# Patient Record
Sex: FEMALE | Race: WHITE | Employment: FULL TIME | URBAN - NONMETROPOLITAN AREA
[De-identification: names, ages, dates, MRNs, and addresses within clinical notes are randomized per-mention and may not be internally consistent; named-entity substitution may affect disease eponyms.]

---

## 2019-05-09 ENCOUNTER — OFFICE VISIT (OUTPATIENT)
Dept: FAMILY MEDICINE CLINIC | Age: 39
End: 2019-05-09
Payer: COMMERCIAL

## 2019-05-09 VITALS
WEIGHT: 147.2 LBS | SYSTOLIC BLOOD PRESSURE: 110 MMHG | RESPIRATION RATE: 16 BRPM | TEMPERATURE: 98.4 F | HEART RATE: 74 BPM | BODY MASS INDEX: 24.53 KG/M2 | HEIGHT: 65 IN | OXYGEN SATURATION: 99 % | DIASTOLIC BLOOD PRESSURE: 70 MMHG

## 2019-05-09 DIAGNOSIS — M54.50 CHRONIC BILATERAL LOW BACK PAIN WITHOUT SCIATICA: Primary | ICD-10-CM

## 2019-05-09 DIAGNOSIS — G89.29 CHRONIC BILATERAL LOW BACK PAIN WITHOUT SCIATICA: Primary | ICD-10-CM

## 2019-05-09 PROCEDURE — G8427 DOCREV CUR MEDS BY ELIG CLIN: HCPCS | Performed by: FAMILY MEDICINE

## 2019-05-09 PROCEDURE — 99203 OFFICE O/P NEW LOW 30 MIN: CPT | Performed by: FAMILY MEDICINE

## 2019-05-09 PROCEDURE — G8420 CALC BMI NORM PARAMETERS: HCPCS | Performed by: FAMILY MEDICINE

## 2019-05-09 PROCEDURE — 1036F TOBACCO NON-USER: CPT | Performed by: FAMILY MEDICINE

## 2019-05-09 RX ORDER — PREDNISONE 10 MG/1
TABLET ORAL
Qty: 20 TABLET | Refills: 0 | Status: SHIPPED | OUTPATIENT
Start: 2019-05-09

## 2019-05-09 RX ORDER — IBUPROFEN 200 MG
200 TABLET ORAL EVERY 6 HOURS PRN
COMMUNITY

## 2019-05-09 RX ORDER — LANOLIN ALCOHOL/MO/W.PET/CERES
3 CREAM (GRAM) TOPICAL DAILY
COMMUNITY

## 2019-05-09 SDOH — HEALTH STABILITY: MENTAL HEALTH: HOW MANY STANDARD DRINKS CONTAINING ALCOHOL DO YOU HAVE ON A TYPICAL DAY?: 1 OR 2

## 2019-05-09 SDOH — HEALTH STABILITY: MENTAL HEALTH: HOW OFTEN DO YOU HAVE A DRINK CONTAINING ALCOHOL?: 2-3 TIMES A WEEK

## 2019-05-09 ASSESSMENT — ENCOUNTER SYMPTOMS
RESPIRATORY NEGATIVE: 1
BACK PAIN: 1
GASTROINTESTINAL NEGATIVE: 1

## 2019-05-09 ASSESSMENT — PATIENT HEALTH QUESTIONNAIRE - PHQ9
SUM OF ALL RESPONSES TO PHQ9 QUESTIONS 1 & 2: 0
2. FEELING DOWN, DEPRESSED OR HOPELESS: 0
SUM OF ALL RESPONSES TO PHQ QUESTIONS 1-9: 0
1. LITTLE INTEREST OR PLEASURE IN DOING THINGS: 0
SUM OF ALL RESPONSES TO PHQ QUESTIONS 1-9: 0

## 2019-05-09 NOTE — PATIENT INSTRUCTIONS
Patient Education   vitamin d 3 2000 iu a day        Piriformis Syndrome: Exercises  Your Care Instructions  Here are some examples of typical rehabilitation exercises for your condition. Start each exercise slowly. Ease off the exercise if you start to have pain. Your doctor or physical therapist will tell you when you can start these exercises and which ones will work best for you. How to do the exercises  Hip rotator stretch    1. Lie on your back with both knees bent and your feet flat on the floor. 2. Put the ankle of your affected leg on your opposite thigh near your knee. 3. Use your hand to gently push your knee (on your affected leg) away from your body until you feel a gentle stretch around your hip. 4. Hold the stretch for 15 to 30 seconds. 5. Repeat 2 to 4 times. 6. Switch legs and repeat steps 1 through 5. Piriformis stretch    1. Lie on your back with your legs straight. 2. Lift your affected leg and bend your knee. With your opposite hand, reach across your body, and then gently pull your knee toward your opposite shoulder. 3. Hold the stretch for 15 to 30 seconds. 4. Repeat with your other leg. 5. Repeat 2 to 4 times on each side. Lower abdominal strengthening    1. Lie on your back with your knees bent and your feet flat on the floor. 2. Tighten your belly muscles by pulling your belly button in toward your spine. 3. Lift one foot off the floor and bring your knee toward your chest, so that your knee is straight above your hip and your leg is bent like the letter \"L. \"  4. Lift the other knee up to the same position. 5. Lower one leg at a time to the starting position. 6. Keep alternating legs until you have lifted each leg 8 to 12 times. 7. Be sure to keep your belly muscles tight and your back still as you are moving your legs. Be sure to breathe normally. Follow-up care is a key part of your treatment and safety.  Be sure to make and go to all appointments, and call your doctor if you are having problems. It's also a good idea to know your test results and keep a list of the medicines you take. Current as of: September 20, 2018  Content Version: 12.0  © 2110-5921 [employer]. Care instructions adapted under license by [facility] ([facility]). If you have questions about a medical condition or this instruction, always ask your healthcare professional. [place] [address] any warranty or liability for your use of this information.

## 2019-05-09 NOTE — PROGRESS NOTES
Subjective:      Patient ID: Bethany Xiong is a 37 y.o. female. Here for first time  Has been having low back pain for the 17 years which had come and goes but in the recent past has gotten worse. Is seeing pain management. Review of Systems   Constitutional: Negative. HENT: Negative. Respiratory: Negative. Cardiovascular: Negative. Gastrointestinal: Negative. Genitourinary: Negative. Musculoskeletal: Positive for back pain. Psychiatric/Behavioral: Positive for sleep disturbance. Objective:   Physical Exam   Constitutional: She is oriented to person, place, and time. She appears well-developed and well-nourished. HENT:   Head: Normocephalic and atraumatic. Nose: Nose normal.   Mouth/Throat: Oropharynx is clear and moist.   Eyes: Pupils are equal, round, and reactive to light. Cardiovascular: Normal rate and regular rhythm. Pulmonary/Chest: Effort normal.   Abdominal: Soft. There is no tenderness. Musculoskeletal: She exhibits no edema. Has tenderness left si region and tenderness in piriformis region with equivocal findings     Lymphadenopathy:     She has cervical adenopathy (small). Neurological: She is alert and oriented to person, place, and time. Skin: Skin is warm and dry. Nursing note and vitals reviewed. Assessment:      Low back pain without radiation        Plan: To follow with pain management. Will give prednisone burst and start stretching for piriformis. Caffeine discussion. Vitamin d 3 2000 iu day. Refusing mammogram.  Discussed labs and will consider in future. Follow up for annual and her convenience.         Mateo Tucker MD